# Patient Record
Sex: FEMALE | Race: WHITE | NOT HISPANIC OR LATINO | URBAN - METROPOLITAN AREA
[De-identification: names, ages, dates, MRNs, and addresses within clinical notes are randomized per-mention and may not be internally consistent; named-entity substitution may affect disease eponyms.]

---

## 2022-01-31 ENCOUNTER — PREPPED CHART (OUTPATIENT)
Dept: URBAN - METROPOLITAN AREA CLINIC 101 | Facility: CLINIC | Age: 72
End: 2022-01-31

## 2022-01-31 PROBLEM — H35.3122 DRY AMD, INTERMEDIATE DRY STAGE: Noted: 2022-01-31

## 2022-01-31 PROBLEM — H43.813 POSTERIOR VITREOUS DETACHMENT: Noted: 2022-01-31

## 2022-01-31 PROBLEM — H35.3211 NEOVASCULAR AMD WITH ACTIVE CNV: Noted: 2022-01-31

## 2022-01-31 PROBLEM — H25.13 NS CATARACT: Noted: 2022-01-31

## 2022-01-31 PROBLEM — H33.311 HORSESHOE TEAR OF RETINA WITHOUT DETACHMENT: Noted: 2022-01-31

## 2022-01-31 PROBLEM — H33.311 TREATED HORSESHOE TEAR: Noted: 2022-01-31

## 2022-02-17 ASSESSMENT — VISUAL ACUITY
OS_CC: 20/30-2
OD_CC: 20/50-1
OD_PH: 20/30-2

## 2022-02-17 ASSESSMENT — TONOMETRY
OD_IOP_MMHG: 17
OS_IOP_MMHG: 17

## 2022-02-28 ENCOUNTER — FOLLOW UP (OUTPATIENT)
Dept: URBAN - METROPOLITAN AREA CLINIC 101 | Facility: CLINIC | Age: 72
End: 2022-02-28

## 2022-02-28 DIAGNOSIS — H35.3211: ICD-10-CM

## 2022-02-28 DIAGNOSIS — H35.3122: ICD-10-CM

## 2022-02-28 PROCEDURE — 67028 INJECTION EYE DRUG: CPT

## 2022-02-28 PROCEDURE — 92134 CPTRZ OPH DX IMG PST SGM RTA: CPT

## 2022-02-28 PROCEDURE — 92202 OPSCPY EXTND ON/MAC DRAW: CPT | Mod: 59

## 2022-02-28 PROCEDURE — PF5 LUCENTIS PREFILLED SYRINGE

## 2022-02-28 PROCEDURE — 99214 OFFICE O/P EST MOD 30 MIN: CPT | Mod: 25

## 2022-02-28 ASSESSMENT — TONOMETRY
OD_IOP_MMHG: 16
OS_IOP_MMHG: 16

## 2022-02-28 ASSESSMENT — VISUAL ACUITY
OD_CC: 20/50-1
OS_CC: 20/30-1

## 2022-04-07 ENCOUNTER — FOLLOW UP (OUTPATIENT)
Dept: URBAN - METROPOLITAN AREA CLINIC 101 | Facility: CLINIC | Age: 72
End: 2022-04-07

## 2022-04-07 DIAGNOSIS — H43.813: ICD-10-CM

## 2022-04-07 DIAGNOSIS — H35.3211: ICD-10-CM

## 2022-04-07 DIAGNOSIS — H35.3122: ICD-10-CM

## 2022-04-07 PROCEDURE — 67028 INJECTION EYE DRUG: CPT

## 2022-04-07 PROCEDURE — PF5 LUCENTIS PREFILLED SYRINGE

## 2022-04-07 PROCEDURE — 92134 CPTRZ OPH DX IMG PST SGM RTA: CPT

## 2022-04-07 PROCEDURE — 92202 OPSCPY EXTND ON/MAC DRAW: CPT | Mod: 59

## 2022-04-07 PROCEDURE — 99214 OFFICE O/P EST MOD 30 MIN: CPT | Mod: 25

## 2022-04-07 ASSESSMENT — TONOMETRY
OD_IOP_MMHG: 14
OS_IOP_MMHG: 13

## 2022-04-07 ASSESSMENT — VISUAL ACUITY
OD_CC: 20/40-1
OS_CC: 20/40-2

## 2022-05-23 ENCOUNTER — FOLLOW UP (OUTPATIENT)
Dept: URBAN - METROPOLITAN AREA CLINIC 101 | Facility: CLINIC | Age: 72
End: 2022-05-23

## 2022-05-23 DIAGNOSIS — H35.3122: ICD-10-CM

## 2022-05-23 DIAGNOSIS — H43.813: ICD-10-CM

## 2022-05-23 DIAGNOSIS — H33.311: ICD-10-CM

## 2022-05-23 DIAGNOSIS — H35.3211: ICD-10-CM

## 2022-05-23 PROCEDURE — 92014 COMPRE OPH EXAM EST PT 1/>: CPT | Mod: 25

## 2022-05-23 PROCEDURE — PF5 LUCENTIS PREFILLED SYRINGE

## 2022-05-23 PROCEDURE — 92202 OPSCPY EXTND ON/MAC DRAW: CPT | Mod: 59

## 2022-05-23 PROCEDURE — 67028 INJECTION EYE DRUG: CPT

## 2022-05-23 PROCEDURE — 92134 CPTRZ OPH DX IMG PST SGM RTA: CPT

## 2022-05-23 ASSESSMENT — VISUAL ACUITY
OD_CC: 20/50-1
OS_PH: 20/20-2
OS_CC: 20/25-1

## 2022-05-23 ASSESSMENT — TONOMETRY
OS_IOP_MMHG: 15
OD_IOP_MMHG: 15

## 2022-06-20 ENCOUNTER — FOLLOW UP (OUTPATIENT)
Dept: URBAN - METROPOLITAN AREA CLINIC 101 | Facility: CLINIC | Age: 72
End: 2022-06-20

## 2022-06-20 DIAGNOSIS — H35.3211: ICD-10-CM

## 2022-06-20 DIAGNOSIS — H33.311: ICD-10-CM

## 2022-06-20 DIAGNOSIS — H43.813: ICD-10-CM

## 2022-06-20 DIAGNOSIS — H35.3122: ICD-10-CM

## 2022-06-20 PROCEDURE — 92202 OPSCPY EXTND ON/MAC DRAW: CPT | Mod: 59

## 2022-06-20 PROCEDURE — 67028 INJECTION EYE DRUG: CPT

## 2022-06-20 PROCEDURE — PF5 LUCENTIS PREFILLED SYRINGE

## 2022-06-20 PROCEDURE — 92014 COMPRE OPH EXAM EST PT 1/>: CPT | Mod: 25

## 2022-06-20 PROCEDURE — 92134 CPTRZ OPH DX IMG PST SGM RTA: CPT

## 2022-06-20 ASSESSMENT — VISUAL ACUITY
OS_CC: 20/30
OD_PH: 20/50-2
OD_CC: 20/70-1

## 2022-06-20 ASSESSMENT — TONOMETRY
OS_IOP_MMHG: 12
OD_IOP_MMHG: 12

## 2022-08-01 ENCOUNTER — FOLLOW UP (OUTPATIENT)
Dept: URBAN - METROPOLITAN AREA CLINIC 101 | Facility: CLINIC | Age: 72
End: 2022-08-01

## 2022-08-01 DIAGNOSIS — H35.3122: ICD-10-CM

## 2022-08-01 DIAGNOSIS — H35.3211: ICD-10-CM

## 2022-08-01 DIAGNOSIS — H33.311: ICD-10-CM

## 2022-08-01 DIAGNOSIS — H43.813: ICD-10-CM

## 2022-08-01 PROCEDURE — 67028 INJECTION EYE DRUG: CPT

## 2022-08-01 PROCEDURE — 92014 COMPRE OPH EXAM EST PT 1/>: CPT | Mod: 25

## 2022-08-01 PROCEDURE — 92134 CPTRZ OPH DX IMG PST SGM RTA: CPT

## 2022-08-01 PROCEDURE — PF5 LUCENTIS PREFILLED SYRINGE

## 2022-08-01 PROCEDURE — 92202 OPSCPY EXTND ON/MAC DRAW: CPT | Mod: 59

## 2022-08-01 ASSESSMENT — VISUAL ACUITY
OS_CC: 20/25
OS_PH: 20/20-2
OD_CC: 20/50-1

## 2022-08-01 ASSESSMENT — TONOMETRY
OD_IOP_MMHG: 13
OS_IOP_MMHG: 11

## 2022-09-01 ENCOUNTER — FOLLOW UP (OUTPATIENT)
Dept: URBAN - METROPOLITAN AREA CLINIC 101 | Facility: CLINIC | Age: 72
End: 2022-09-01

## 2022-09-01 DIAGNOSIS — H33.311: ICD-10-CM

## 2022-09-01 DIAGNOSIS — H35.3122: ICD-10-CM

## 2022-09-01 DIAGNOSIS — H35.3211: ICD-10-CM

## 2022-09-01 DIAGNOSIS — H43.813: ICD-10-CM

## 2022-09-01 PROCEDURE — 92014 COMPRE OPH EXAM EST PT 1/>: CPT | Mod: 25

## 2022-09-01 PROCEDURE — PF5 LUCENTIS PREFILLED SYRINGE

## 2022-09-01 PROCEDURE — 92201 OPSCPY EXTND RTA DRAW UNI/BI: CPT | Mod: 59

## 2022-09-01 PROCEDURE — 92134 CPTRZ OPH DX IMG PST SGM RTA: CPT

## 2022-09-01 PROCEDURE — 67028 INJECTION EYE DRUG: CPT

## 2022-09-01 ASSESSMENT — TONOMETRY
OD_IOP_MMHG: 13
OS_IOP_MMHG: 14

## 2022-09-01 ASSESSMENT — VISUAL ACUITY
OD_PH: 20/50
OS_CC: 20/25+1
OD_CC: 20/60-2

## 2022-09-29 ENCOUNTER — FOLLOW UP (OUTPATIENT)
Dept: URBAN - METROPOLITAN AREA CLINIC 101 | Facility: CLINIC | Age: 72
End: 2022-09-29

## 2022-09-29 DIAGNOSIS — H35.3211: ICD-10-CM

## 2022-09-29 DIAGNOSIS — H35.3122: ICD-10-CM

## 2022-09-29 DIAGNOSIS — H33.311: ICD-10-CM

## 2022-09-29 DIAGNOSIS — H43.813: ICD-10-CM

## 2022-09-29 PROCEDURE — 67028 INJECTION EYE DRUG: CPT

## 2022-09-29 PROCEDURE — J3590VAB VABYSMO INJECTION

## 2022-09-29 PROCEDURE — 92134 CPTRZ OPH DX IMG PST SGM RTA: CPT

## 2022-09-29 PROCEDURE — 92014 COMPRE OPH EXAM EST PT 1/>: CPT | Mod: 25

## 2022-09-29 PROCEDURE — 92201 OPSCPY EXTND RTA DRAW UNI/BI: CPT | Mod: 59

## 2022-09-29 ASSESSMENT — VISUAL ACUITY
OD_CC: 20/60+1
OS_CC: 20/30

## 2022-09-29 ASSESSMENT — TONOMETRY
OS_IOP_MMHG: 13
OD_IOP_MMHG: 13

## 2022-11-07 ENCOUNTER — FOLLOW UP (OUTPATIENT)
Dept: URBAN - METROPOLITAN AREA CLINIC 101 | Facility: CLINIC | Age: 72
End: 2022-11-07

## 2022-11-07 DIAGNOSIS — H43.813: ICD-10-CM

## 2022-11-07 DIAGNOSIS — H33.311: ICD-10-CM

## 2022-11-07 DIAGNOSIS — H35.3211: ICD-10-CM

## 2022-11-07 DIAGNOSIS — H35.3122: ICD-10-CM

## 2022-11-07 PROCEDURE — 92014 COMPRE OPH EXAM EST PT 1/>: CPT | Mod: 25

## 2022-11-07 PROCEDURE — 92134 CPTRZ OPH DX IMG PST SGM RTA: CPT

## 2022-11-07 PROCEDURE — 92201 OPSCPY EXTND RTA DRAW UNI/BI: CPT | Mod: 59

## 2022-11-07 PROCEDURE — 67028 INJECTION EYE DRUG: CPT

## 2022-11-07 ASSESSMENT — TONOMETRY
OD_IOP_MMHG: 14
OS_IOP_MMHG: 16

## 2022-11-07 ASSESSMENT — VISUAL ACUITY
OD_CC: 20/40-2
OS_CC: 20/30

## 2022-12-19 ENCOUNTER — FOLLOW UP (OUTPATIENT)
Dept: URBAN - METROPOLITAN AREA CLINIC 101 | Facility: CLINIC | Age: 72
End: 2022-12-19

## 2022-12-19 DIAGNOSIS — H35.3122: ICD-10-CM

## 2022-12-19 DIAGNOSIS — H43.813: ICD-10-CM

## 2022-12-19 DIAGNOSIS — H35.3211: ICD-10-CM

## 2022-12-19 DIAGNOSIS — H25.13: ICD-10-CM

## 2022-12-19 DIAGNOSIS — H33.311: ICD-10-CM

## 2022-12-19 PROCEDURE — 92134 CPTRZ OPH DX IMG PST SGM RTA: CPT

## 2022-12-19 PROCEDURE — 67028 INJECTION EYE DRUG: CPT

## 2022-12-19 PROCEDURE — 92202 OPSCPY EXTND ON/MAC DRAW: CPT | Mod: 59

## 2022-12-19 PROCEDURE — 92014 COMPRE OPH EXAM EST PT 1/>: CPT | Mod: 25

## 2022-12-19 ASSESSMENT — TONOMETRY
OD_IOP_MMHG: 14
OS_IOP_MMHG: 15

## 2022-12-19 ASSESSMENT — VISUAL ACUITY
OD_CC: 20/30-1
OS_CC: 20/25

## 2023-02-06 ENCOUNTER — FOLLOW UP (OUTPATIENT)
Dept: URBAN - METROPOLITAN AREA CLINIC 101 | Facility: CLINIC | Age: 73
End: 2023-02-06

## 2023-02-06 DIAGNOSIS — H33.311: ICD-10-CM

## 2023-02-06 DIAGNOSIS — H43.813: ICD-10-CM

## 2023-02-06 DIAGNOSIS — H35.3122: ICD-10-CM

## 2023-02-06 DIAGNOSIS — H35.3211: ICD-10-CM

## 2023-02-06 DIAGNOSIS — H25.13: ICD-10-CM

## 2023-02-06 PROCEDURE — 92014 COMPRE OPH EXAM EST PT 1/>: CPT | Mod: 25

## 2023-02-06 PROCEDURE — 92134 CPTRZ OPH DX IMG PST SGM RTA: CPT

## 2023-02-06 PROCEDURE — 92201 OPSCPY EXTND RTA DRAW UNI/BI: CPT | Mod: 59

## 2023-02-06 PROCEDURE — 67028 INJECTION EYE DRUG: CPT

## 2023-02-06 ASSESSMENT — TONOMETRY
OD_IOP_MMHG: 15
OS_IOP_MMHG: 16

## 2023-02-06 ASSESSMENT — VISUAL ACUITY
OD_CC: 20/25
OS_CC: 20/25-2

## 2023-03-20 ENCOUNTER — FOLLOW UP (OUTPATIENT)
Dept: URBAN - METROPOLITAN AREA CLINIC 101 | Facility: CLINIC | Age: 73
End: 2023-03-20

## 2023-03-20 DIAGNOSIS — H33.311: ICD-10-CM

## 2023-03-20 DIAGNOSIS — H43.813: ICD-10-CM

## 2023-03-20 DIAGNOSIS — H35.3122: ICD-10-CM

## 2023-03-20 DIAGNOSIS — H25.13: ICD-10-CM

## 2023-03-20 DIAGNOSIS — H35.3211: ICD-10-CM

## 2023-03-20 PROCEDURE — 92014 COMPRE OPH EXAM EST PT 1/>: CPT | Mod: 25

## 2023-03-20 PROCEDURE — 67028 INJECTION EYE DRUG: CPT

## 2023-03-20 PROCEDURE — 92134 CPTRZ OPH DX IMG PST SGM RTA: CPT

## 2023-03-20 PROCEDURE — 92201 OPSCPY EXTND RTA DRAW UNI/BI: CPT | Mod: 59

## 2023-03-20 ASSESSMENT — TONOMETRY
OS_IOP_MMHG: 18
OD_IOP_MMHG: 15

## 2023-03-20 ASSESSMENT — VISUAL ACUITY
OS_CC: 20/25
OD_CC: 20/30

## 2023-05-15 ENCOUNTER — FOLLOW UP (OUTPATIENT)
Dept: URBAN - METROPOLITAN AREA CLINIC 101 | Facility: CLINIC | Age: 73
End: 2023-05-15

## 2023-05-15 DIAGNOSIS — H35.3122: ICD-10-CM

## 2023-05-15 DIAGNOSIS — H43.813: ICD-10-CM

## 2023-05-15 DIAGNOSIS — H25.13: ICD-10-CM

## 2023-05-15 DIAGNOSIS — H33.311: ICD-10-CM

## 2023-05-15 DIAGNOSIS — H35.3211: ICD-10-CM

## 2023-05-15 PROCEDURE — 92134 CPTRZ OPH DX IMG PST SGM RTA: CPT

## 2023-05-15 PROCEDURE — 67028 INJECTION EYE DRUG: CPT

## 2023-05-15 PROCEDURE — 92014 COMPRE OPH EXAM EST PT 1/>: CPT | Mod: 25

## 2023-05-15 PROCEDURE — 92202 OPSCPY EXTND ON/MAC DRAW: CPT | Mod: NC

## 2023-05-15 ASSESSMENT — VISUAL ACUITY
OD_CC: 20/40
OS_CC: 20/25

## 2023-05-15 ASSESSMENT — TONOMETRY
OD_IOP_MMHG: 14
OS_IOP_MMHG: 14

## 2023-07-17 ENCOUNTER — FOLLOW UP (OUTPATIENT)
Dept: URBAN - METROPOLITAN AREA CLINIC 101 | Facility: CLINIC | Age: 73
End: 2023-07-17

## 2023-07-17 DIAGNOSIS — H33.311: ICD-10-CM

## 2023-07-17 DIAGNOSIS — H25.13: ICD-10-CM

## 2023-07-17 DIAGNOSIS — H35.3211: ICD-10-CM

## 2023-07-17 DIAGNOSIS — H43.813: ICD-10-CM

## 2023-07-17 DIAGNOSIS — H35.3122: ICD-10-CM

## 2023-07-17 PROCEDURE — 92134 CPTRZ OPH DX IMG PST SGM RTA: CPT

## 2023-07-17 PROCEDURE — 92014 COMPRE OPH EXAM EST PT 1/>: CPT | Mod: 25

## 2023-07-17 PROCEDURE — 67028 INJECTION EYE DRUG: CPT

## 2023-07-17 PROCEDURE — 92202 OPSCPY EXTND ON/MAC DRAW: CPT | Mod: 59

## 2023-07-17 ASSESSMENT — VISUAL ACUITY
OD_CC: 20/50
OD_PH: 20/40-1
OS_CC: 20/25

## 2023-07-17 ASSESSMENT — TONOMETRY
OS_IOP_MMHG: 17
OD_IOP_MMHG: 18

## 2023-09-28 ENCOUNTER — FOLLOW UP (OUTPATIENT)
Dept: URBAN - METROPOLITAN AREA CLINIC 101 | Facility: CLINIC | Age: 73
End: 2023-09-28

## 2023-09-28 DIAGNOSIS — H33.311: ICD-10-CM

## 2023-09-28 DIAGNOSIS — H25.13: ICD-10-CM

## 2023-09-28 DIAGNOSIS — H43.813: ICD-10-CM

## 2023-09-28 DIAGNOSIS — H35.3211: ICD-10-CM

## 2023-09-28 DIAGNOSIS — H35.3122: ICD-10-CM

## 2023-09-28 PROCEDURE — 92134 CPTRZ OPH DX IMG PST SGM RTA: CPT

## 2023-09-28 PROCEDURE — 92202 OPSCPY EXTND ON/MAC DRAW: CPT | Mod: 59

## 2023-09-28 PROCEDURE — 92014 COMPRE OPH EXAM EST PT 1/>: CPT | Mod: 25

## 2023-09-28 PROCEDURE — 67028 INJECTION EYE DRUG: CPT

## 2023-09-28 ASSESSMENT — TONOMETRY
OS_IOP_MMHG: 13
OD_IOP_MMHG: 16

## 2023-09-28 ASSESSMENT — VISUAL ACUITY
OS_CC: 20/40
OD_CC: 20/50-2
OS_PH: 20/30-2

## 2023-12-18 ENCOUNTER — FOLLOW UP (OUTPATIENT)
Dept: URBAN - METROPOLITAN AREA CLINIC 101 | Facility: CLINIC | Age: 73
End: 2023-12-18

## 2023-12-18 DIAGNOSIS — H35.3211: ICD-10-CM

## 2023-12-18 DIAGNOSIS — H35.3122: ICD-10-CM

## 2023-12-18 DIAGNOSIS — H25.13: ICD-10-CM

## 2023-12-18 DIAGNOSIS — H43.813: ICD-10-CM

## 2023-12-18 DIAGNOSIS — H33.311: ICD-10-CM

## 2023-12-18 PROCEDURE — 67028 INJECTION EYE DRUG: CPT

## 2023-12-18 PROCEDURE — 92134 CPTRZ OPH DX IMG PST SGM RTA: CPT

## 2023-12-18 PROCEDURE — 92202 OPSCPY EXTND ON/MAC DRAW: CPT | Mod: 59

## 2023-12-18 PROCEDURE — 92014 COMPRE OPH EXAM EST PT 1/>: CPT | Mod: 25

## 2023-12-18 ASSESSMENT — VISUAL ACUITY
OS_CC: 20/30
OD_CC: 20/40
OS_PH: 20/25

## 2023-12-18 ASSESSMENT — TONOMETRY
OD_IOP_MMHG: 12
OS_IOP_MMHG: 12

## 2024-04-01 ENCOUNTER — FOLLOW UP (OUTPATIENT)
Dept: URBAN - METROPOLITAN AREA CLINIC 101 | Facility: CLINIC | Age: 74
End: 2024-04-01

## 2024-04-01 DIAGNOSIS — H35.3122: ICD-10-CM

## 2024-04-01 DIAGNOSIS — H35.3211: ICD-10-CM

## 2024-04-01 DIAGNOSIS — H25.13: ICD-10-CM

## 2024-04-01 DIAGNOSIS — H43.813: ICD-10-CM

## 2024-04-01 DIAGNOSIS — H33.311: ICD-10-CM

## 2024-04-01 PROCEDURE — 92134 CPTRZ OPH DX IMG PST SGM RTA: CPT

## 2024-04-01 PROCEDURE — 67028 INJECTION EYE DRUG: CPT

## 2024-04-01 PROCEDURE — 92202 OPSCPY EXTND ON/MAC DRAW: CPT | Mod: 59

## 2024-04-01 PROCEDURE — 92014 COMPRE OPH EXAM EST PT 1/>: CPT | Mod: 25

## 2024-04-01 ASSESSMENT — VISUAL ACUITY
OD_PH: 20/40-1
OD_CC: 20/50-2
OS_CC: 20/30-2

## 2024-04-01 ASSESSMENT — TONOMETRY
OS_IOP_MMHG: 12
OD_IOP_MMHG: 17

## 2024-07-15 ENCOUNTER — FOLLOW UP (OUTPATIENT)
Dept: URBAN - METROPOLITAN AREA CLINIC 101 | Facility: CLINIC | Age: 74
End: 2024-07-15

## 2024-07-15 DIAGNOSIS — H25.13: ICD-10-CM

## 2024-07-15 DIAGNOSIS — H43.813: ICD-10-CM

## 2024-07-15 DIAGNOSIS — H33.311: ICD-10-CM

## 2024-07-15 DIAGNOSIS — H35.3211: ICD-10-CM

## 2024-07-15 DIAGNOSIS — H35.3122: ICD-10-CM

## 2024-07-15 PROCEDURE — 92134 CPTRZ OPH DX IMG PST SGM RTA: CPT

## 2024-07-15 PROCEDURE — 92014 COMPRE OPH EXAM EST PT 1/>: CPT | Mod: 25

## 2024-07-15 PROCEDURE — 92201 OPSCPY EXTND RTA DRAW UNI/BI: CPT | Mod: 59

## 2024-07-15 PROCEDURE — 67028 INJECTION EYE DRUG: CPT

## 2024-07-15 ASSESSMENT — VISUAL ACUITY
OS_CC: 20/20-1
OD_PH: 20/40-1
OD_CC: 20/50-1

## 2024-07-15 ASSESSMENT — TONOMETRY
OS_IOP_MMHG: 15
OD_IOP_MMHG: 17

## 2024-08-22 ENCOUNTER — APPOINTMENT (OUTPATIENT)
Age: 74
Setting detail: DERMATOLOGY
End: 2024-08-22

## 2024-08-22 DIAGNOSIS — D485 NEOPLASM OF UNCERTAIN BEHAVIOR OF SKIN: ICD-10-CM

## 2024-08-22 DIAGNOSIS — L57.8 OTHER SKIN CHANGES DUE TO CHRONIC EXPOSURE TO NONIONIZING RADIATION: ICD-10-CM

## 2024-08-22 DIAGNOSIS — D18.0 HEMANGIOMA: ICD-10-CM

## 2024-08-22 DIAGNOSIS — L82.0 INFLAMED SEBORRHEIC KERATOSIS: ICD-10-CM

## 2024-08-22 DIAGNOSIS — D22 MELANOCYTIC NEVI: ICD-10-CM

## 2024-08-22 DIAGNOSIS — Z71.89 OTHER SPECIFIED COUNSELING: ICD-10-CM

## 2024-08-22 DIAGNOSIS — L82.1 OTHER SEBORRHEIC KERATOSIS: ICD-10-CM

## 2024-08-22 PROBLEM — D18.01 HEMANGIOMA OF SKIN AND SUBCUTANEOUS TISSUE: Status: ACTIVE | Noted: 2024-08-22

## 2024-08-22 PROBLEM — D22.5 MELANOCYTIC NEVI OF TRUNK: Status: ACTIVE | Noted: 2024-08-22

## 2024-08-22 PROBLEM — D22.72 MELANOCYTIC NEVI OF LEFT LOWER LIMB, INCLUDING HIP: Status: ACTIVE | Noted: 2024-08-22

## 2024-08-22 PROBLEM — D22.62 MELANOCYTIC NEVI OF LEFT UPPER LIMB, INCLUDING SHOULDER: Status: ACTIVE | Noted: 2024-08-22

## 2024-08-22 PROBLEM — D22.71 MELANOCYTIC NEVI OF RIGHT LOWER LIMB, INCLUDING HIP: Status: ACTIVE | Noted: 2024-08-22

## 2024-08-22 PROBLEM — D48.5 NEOPLASM OF UNCERTAIN BEHAVIOR OF SKIN: Status: ACTIVE | Noted: 2024-08-22

## 2024-08-22 PROBLEM — D22.61 MELANOCYTIC NEVI OF RIGHT UPPER LIMB, INCLUDING SHOULDER: Status: ACTIVE | Noted: 2024-08-22

## 2024-08-22 PROCEDURE — ? BIOPSY BY SHAVE METHOD

## 2024-08-22 PROCEDURE — 99203 OFFICE O/P NEW LOW 30 MIN: CPT | Mod: 25

## 2024-08-22 PROCEDURE — 11102 TANGNTL BX SKIN SINGLE LES: CPT | Mod: 59

## 2024-08-22 PROCEDURE — 17110 DESTRUCTION B9 LES UP TO 14: CPT

## 2024-08-22 PROCEDURE — ? COUNSELING

## 2024-08-22 PROCEDURE — ? EDUCATIONAL RESOURCES PROVIDED

## 2024-08-22 PROCEDURE — ? SUNSCREEN RECOMMENDATIONS

## 2024-08-22 PROCEDURE — ? PHOTO-DOCUMENTATION

## 2024-08-22 PROCEDURE — ? LIQUID NITROGEN

## 2024-08-22 ASSESSMENT — LOCATION DETAILED DESCRIPTION DERM
LOCATION DETAILED: RIGHT ANTECUBITAL SKIN
LOCATION DETAILED: LEFT ANTERIOR PROXIMAL THIGH
LOCATION DETAILED: RIGHT POSTERIOR SHOULDER
LOCATION DETAILED: LEFT POSTERIOR SHOULDER
LOCATION DETAILED: LEFT ANTERIOR DISTAL THIGH
LOCATION DETAILED: RIGHT ANTERIOR DISTAL THIGH
LOCATION DETAILED: RIGHT LATERAL ABDOMEN
LOCATION DETAILED: PERIUMBILICAL SKIN
LOCATION DETAILED: LEFT ANTERIOR DISTAL UPPER ARM
LOCATION DETAILED: RIGHT DISTAL DORSAL FOREARM

## 2024-08-22 ASSESSMENT — LOCATION ZONE DERM
LOCATION ZONE: ARM
LOCATION ZONE: TRUNK
LOCATION ZONE: LEG

## 2024-08-22 ASSESSMENT — LOCATION SIMPLE DESCRIPTION DERM
LOCATION SIMPLE: RIGHT SHOULDER
LOCATION SIMPLE: RIGHT FOREARM
LOCATION SIMPLE: LEFT SHOULDER
LOCATION SIMPLE: ABDOMEN
LOCATION SIMPLE: RIGHT THIGH
LOCATION SIMPLE: LEFT THIGH
LOCATION SIMPLE: RIGHT UPPER ARM
LOCATION SIMPLE: LEFT UPPER ARM

## 2024-08-22 NOTE — PROCEDURE: LIQUID NITROGEN
Include Z78.9 (Other Specified Conditions Influencing Health Status) As An Associated Diagnosis?: No
Medical Necessity Information: It is in your best interest to select a reason for this procedure from the list below. All of these items fulfill various CMS LCD requirements except the new and changing color options.
Detail Level: Detailed
Show Aperture Variable?: Yes
Application Tool (Optional): Liquid Nitrogen Sprayer
Number Of Freeze-Thaw Cycles: 1 freeze-thaw cycle
Consent: The patient's consent was obtained including but not limited to risks of crusting, scabbing, blistering, scarring, darker or lighter pigmentary change, recurrence, incomplete removal and infection.
Aperture Size (Optional): C
Medical Necessity Clause: This procedure was medically necessary because the lesions that were treated were:
Post-Care Instructions: I reviewed with the patient in detail post-care instructions. Patient is to wear sunprotection, and avoid picking at any of the treated lesions. Pt may apply Vaseline to crusted or scabbing areas.
Duration Of Freeze Thaw-Cycle (Seconds): 10
Spray Paint Text: The liquid nitrogen was applied to the skin utilizing a spray paint frosting technique.

## 2024-08-22 NOTE — PROCEDURE: BIOPSY BY SHAVE METHOD
Information: Selecting Yes will display possible errors in your note based on the variables you have selected. This validation is only offered as a suggestion for you. PLEASE NOTE THAT THE VALIDATION TEXT WILL BE REMOVED WHEN YOU FINALIZE YOUR NOTE. IF YOU WANT TO FAX A PRELIMINARY NOTE YOU WILL NEED TO TOGGLE THIS TO 'NO' IF YOU DO NOT WANT IT IN YOUR FAXED NOTE.
Render Post-Care Instructions In Note?: yes
Size Of Lesion In Cm: 0
Hide Additional Size Dimension?: No
Type Of Destruction Used: Curettage
Electrodesiccation Text: The wound bed was treated with electrodesiccation after the biopsy was performed.
Anesthesia Volume In Cc: 0.5
Notification Instructions: Patient will be notified of biopsy results. However, patient instructed to call the office if not contacted within 2 weeks.
Consent was obtained and risks were reviewed including but not limited to scarring, infection, bleeding, scabbing, incomplete removal, nerve damage and allergy to anesthesia.
Detail Level: Detailed
Hemostasis: Aluminum Chloride and Electrocautery
Electrodesiccation And Curettage Text: The wound bed was treated with electrodesiccation and curettage after the biopsy was performed.
Depth Of Biopsy: dermis
Dressing: bandage
Anesthesia Type: 1% lidocaine with epinephrine
Silver Nitrate Text: The wound bed was treated with silver nitrate after the biopsy was performed.
Curettage Text: The wound bed was treated with curettage after the biopsy was performed.
Billing Type: Third-Party Bill
Biopsy Type: H and E
Wound Care: Petrolatum
Biopsy Method: Personna blade
Post-Care Instructions: I reviewed with the patient in detail post-care instructions. Patient is to keep the biopsy site dry overnight, and then apply petrolatum once to twice daily until healed. May apply gentle cleanser to remove any crusting.
Cryotherapy Text: The wound bed was treated with cryotherapy after the biopsy was performed.

## 2024-11-07 ENCOUNTER — FOLLOW UP (OUTPATIENT)
Dept: URBAN - METROPOLITAN AREA CLINIC 101 | Facility: CLINIC | Age: 74
End: 2024-11-07

## 2024-11-07 DIAGNOSIS — H35.3122: ICD-10-CM

## 2024-11-07 DIAGNOSIS — H35.3211: ICD-10-CM

## 2024-11-07 DIAGNOSIS — H43.813: ICD-10-CM

## 2024-11-07 DIAGNOSIS — H33.311: ICD-10-CM

## 2024-11-07 DIAGNOSIS — H25.13: ICD-10-CM

## 2024-11-07 PROCEDURE — 92014 COMPRE OPH EXAM EST PT 1/>: CPT | Mod: 25

## 2024-11-07 PROCEDURE — 92202 OPSCPY EXTND ON/MAC DRAW: CPT | Mod: 59

## 2024-11-07 PROCEDURE — 67028 INJECTION EYE DRUG: CPT

## 2024-11-07 PROCEDURE — 92134 CPTRZ OPH DX IMG PST SGM RTA: CPT

## 2024-11-07 ASSESSMENT — TONOMETRY
OD_IOP_MMHG: 18
OS_IOP_MMHG: 14

## 2024-11-07 ASSESSMENT — VISUAL ACUITY
OS_CC: 20/25-1
OD_CC: 20/50
OD_PH: 20/40